# Patient Record
Sex: MALE | Race: OTHER | NOT HISPANIC OR LATINO | URBAN - METROPOLITAN AREA
[De-identification: names, ages, dates, MRNs, and addresses within clinical notes are randomized per-mention and may not be internally consistent; named-entity substitution may affect disease eponyms.]

---

## 2019-09-16 ENCOUNTER — EMERGENCY (EMERGENCY)
Facility: HOSPITAL | Age: 34
LOS: 1 days | Discharge: HOME | End: 2019-09-16
Attending: EMERGENCY MEDICINE
Payer: COMMERCIAL

## 2019-09-16 VITALS
SYSTOLIC BLOOD PRESSURE: 118 MMHG | RESPIRATION RATE: 18 BRPM | HEIGHT: 67 IN | OXYGEN SATURATION: 96 % | HEART RATE: 95 BPM | DIASTOLIC BLOOD PRESSURE: 76 MMHG | TEMPERATURE: 100 F | WEIGHT: 151.9 LBS

## 2019-09-16 PROCEDURE — 99284 EMERGENCY DEPT VISIT MOD MDM: CPT

## 2019-09-16 RX ORDER — DEXAMETHASONE 0.5 MG/5ML
9 ELIXIR ORAL ONCE
Refills: 0 | Status: COMPLETED | OUTPATIENT
Start: 2019-09-16 | End: 2019-09-16

## 2019-09-16 RX ORDER — DEXAMETHASONE 0.5 MG/5ML
9 ELIXIR ORAL ONCE
Refills: 0 | Status: DISCONTINUED | OUTPATIENT
Start: 2019-09-16 | End: 2019-09-16

## 2019-09-16 RX ORDER — ACETAMINOPHEN 500 MG
975 TABLET ORAL ONCE
Refills: 0 | Status: COMPLETED | OUTPATIENT
Start: 2019-09-16 | End: 2019-09-16

## 2019-09-16 RX ORDER — SODIUM CHLORIDE 9 MG/ML
1000 INJECTION INTRAMUSCULAR; INTRAVENOUS; SUBCUTANEOUS ONCE
Refills: 0 | Status: COMPLETED | OUTPATIENT
Start: 2019-09-16 | End: 2019-09-16

## 2019-09-16 RX ADMIN — Medication 975 MILLIGRAM(S): at 23:41

## 2019-09-16 NOTE — ED ADULT NURSE NOTE - OBJECTIVE STATEMENT
A&Ox3, resp wnl c/o  fever and cough throat pain for 6 days, w A&Ox3, resp wnl c/o  fever and cough throat pain for 6 days,

## 2019-09-16 NOTE — ED PROVIDER NOTE - CLINICAL SUMMARY MEDICAL DECISION MAKING FREE TEXT BOX
PT with stable VS, no acute distress, non toxic appearing, tolerating PO in the ED, no s/s of PTA, deep space infection, PT on appropriate ABx that would cover throat infection, able to tolearte PO meds, improved with supportive care, will dc home with continue OTC meds and ABX, educated about when to return to the ED if needed. PT verbalizes that he understands all instructions and results.

## 2019-09-16 NOTE — ED ADULT TRIAGE NOTE - CHIEF COMPLAINT QUOTE
fever and cough throat pain for 6 days, went to urgent care was told advil, and today went to PMD today placed on ABX and 1330 took advil

## 2019-09-16 NOTE — ED PROVIDER NOTE - OBJECTIVE STATEMENT
PT with no SPMHx presents to the ED with complaint of fever x6 days. PT staes that symptoms started with origibnally just fever nasal congestion tookl tyleolo with little imorivemetn, pt went to [facility] was told he had viral infection was given IBU wo improvmenet symptoms persisted. pt then started to develop thraot pain that he now has that feels like sever stabbing pain made worse with talking and swallowing, that has gotten progressivly worse. pt went to [facility] today  was placed on ABx (Z-pack) and to come to the ED if sympotoms didn't improve after 8HR.   tylenol and nyquil   cough PT with no SPMHx presents to the ED with complaint of fever x6 days. PT states that symptoms started with originally just fever nasal congestion took Tylenol with little improvement, pt went to [facility] was told he had viral infection was given IBU wo improvement symptoms persisted. pt then started to develop throat pain that he now has that feels like sever stabbing pain made worse with talking and swallowing, that has gotten progressively worse. pt went to second [facility] today  was placed on ABx (Z-pack) and to come to the ED if symptoms didn't improve after 8HR. PT dines neck pain, HA, confusion, change in urination, N/V, ABD pain.

## 2019-09-16 NOTE — ED PROVIDER NOTE - NS ED ROS FT
ROS: CONTUSIONAL: Denies fatigue, wt loss. HEAD: Denies trauma, HA, Dizziness. EYE: Denies Acute visual changes, diplopia. ENMT: Denies change in hearing, tinnitus, epistaxis, difficulty swallowing,  CARDIO: Denies CP, palpitations, edema. RESP: Denies Cough, SOB , Diff breathing, hemoptysis. GI: Denies N/V, ABD pain, change in bowel movement. URINARY: Denies difficulty urinating, pelvic pain. MS:  Denies joint pain, back pain, weakness, decreased ROM, swelling. NEURO: Denies change in gait, seizures, loss of sensation, dizziness, confusion LOC.  PSY: NO SI/HI.

## 2019-09-16 NOTE — ED PROVIDER NOTE - THROAT FINDINGS
no exudate/uvula midline/no vesicles/NO TONGUE ELEVATION/NO VESICLES/ULCERS/NO DROOLING/no change in voice,/THROAT RED/NO STRIDOR THROAT RED/NO STRIDOR/NO VESICLES/ULCERS/no change in voice, able to swallow./no exudate/uvula midline/no vesicles/NO DROOLING/NO TONGUE ELEVATION

## 2019-09-16 NOTE — ED PROVIDER NOTE - PATIENT PORTAL LINK FT
You can access the FollowMyHealth Patient Portal offered by [facility] by registering at the following website: [email or web address]. By joining [facility]’s FollowMyHealth portal, you will also be able to view your health information using other applications (apps) compatible with our system.

## 2019-09-16 NOTE — ED PROVIDER NOTE - ATTENDING CONTRIBUTION TO CARE
I personally saw the patient with the PA, and completed the key components of the history and physical exam. I then discussed the management plan with the PA.   gen in nad resp clear cardiac no murmur abd soft neuro intact heeent + pharyngeal erythema no pta uvula midline no drooling or stridor   agree with pa plan of care

## 2019-09-17 VITALS
TEMPERATURE: 99 F | DIASTOLIC BLOOD PRESSURE: 63 MMHG | SYSTOLIC BLOOD PRESSURE: 100 MMHG | OXYGEN SATURATION: 96 % | HEART RATE: 75 BPM

## 2019-09-17 PROCEDURE — 71046 X-RAY EXAM CHEST 2 VIEWS: CPT

## 2019-09-17 PROCEDURE — 71046 X-RAY EXAM CHEST 2 VIEWS: CPT | Mod: 26

## 2019-09-17 PROCEDURE — 96365 THER/PROPH/DIAG IV INF INIT: CPT

## 2019-09-17 PROCEDURE — 99284 EMERGENCY DEPT VISIT MOD MDM: CPT | Mod: 25

## 2019-09-17 RX ADMIN — Medication 101.8 MILLIGRAM(S): at 00:06

## 2019-09-17 RX ADMIN — Medication 9 MILLIGRAM(S): at 00:36

## 2019-09-17 RX ADMIN — SODIUM CHLORIDE 1000 MILLILITER(S): 9 INJECTION INTRAMUSCULAR; INTRAVENOUS; SUBCUTANEOUS at 00:06

## 2019-09-17 RX ADMIN — Medication 975 MILLIGRAM(S): at 01:29

## 2019-09-17 NOTE — ED ADULT NURSE REASSESSMENT NOTE - NS ED NURSE REASSESS COMMENT FT1
received pt from previous Rn Davida Raines .  pt with no complaints of pain or discomfort. awaiting X-ray results.
